# Patient Record
Sex: MALE | Race: WHITE | NOT HISPANIC OR LATINO | Employment: FULL TIME
[De-identification: names, ages, dates, MRNs, and addresses within clinical notes are randomized per-mention and may not be internally consistent; named-entity substitution may affect disease eponyms.]

---

## 2024-08-02 ENCOUNTER — TRANSCRIBE ORDERS (OUTPATIENT)
Dept: OTHER | Age: 44
End: 2024-08-02

## 2024-08-02 DIAGNOSIS — Z98.890 S/P LAMINECTOMY: Primary | ICD-10-CM

## 2024-08-27 ENCOUNTER — THERAPY VISIT (OUTPATIENT)
Dept: PHYSICAL THERAPY | Facility: CLINIC | Age: 44
End: 2024-08-27
Payer: COMMERCIAL

## 2024-08-27 DIAGNOSIS — M54.16 LUMBAR RADICULOPATHY: Primary | ICD-10-CM

## 2024-08-27 PROCEDURE — 97161 PT EVAL LOW COMPLEX 20 MIN: CPT | Mod: GP | Performed by: PHYSICAL THERAPIST

## 2024-08-27 PROCEDURE — 97110 THERAPEUTIC EXERCISES: CPT | Mod: GP | Performed by: PHYSICAL THERAPIST

## 2024-08-27 NOTE — PROGRESS NOTES
"PHYSICAL THERAPY EVALUATION  Type of Visit: Evaluation        Fall Risk Screen:  Fall screen completed by: PT  Have you fallen 2 or more times in the past year?: No  Have you fallen and had an injury in the past year?: No  Is patient a fall risk?: No    Subjective       Presenting condition or subjective complaint: lumbar post op therapy due to stenosis  Date of onset: 06/10/24    Relevant medical history: High blood pressure; Neck injury; Overweight   Dates & types of surgery: 6/10/2024 - L4-S1 foraminotomy    Pt dates current issue back to a snowboarding accident years ago.  Was having difficulty with numbness and weakness into the legs.  Led to lumbar decompression 06/10/2024.  Pt reports doing much better compared to before surgery.  Some numbness into the L leg and occasional cramping in R thigh.  Gave up squats and lunges nearly ten years ago because he didn't feel as \"stable\" in the low back as he wanted to be.  PT in the past has gone well.  Hasn't had much issue with work at home.  No longer having difficulty walking.  \"I don't really have pain\" but sometimtes \"it can tighten up.\"    Goals: bench press, triceps, lats.  \"Not squats or lunges but I don't want to ignore my lower body.\"  Has free weights and incline bench, bands at home.    Prior diagnostic imaging/testing results: MRI; X-ray     Prior therapy history for the same diagnosis, illness or injury: No      Prior Level of Function  Transfers: Independent  Ambulation: Independent  ADL: Independent    Living Environment  Social support: With a significant other or spouse   Type of home: House   Stairs to enter the home: Yes 6 Is there a railing: Yes     Ramp: No   Stairs inside the home: Yes 3 Is there a railing: Yes     Help at home: None  Equipment owned:       Employment: Yes Technology  Hobbies/Interests: boating, campfires, swimming, snowboarding    Patient goals for therapy: i have been unable to lift weights 3x per week since 10/2022. Now that I " have had surgery I would like assistance with a workout routine that will allow me to lift while not risking injury.       Objective   LUMBAR SPINE EVALUATION  PAIN: see above  INTEGUMENTARY (edema, incisions): midline incisional scar without tenderness  POSTURE: WNL  GAIT: Pt ambulates without device without gross asymmetry.  ROM: Lumbar AROM all directions grossly without restriction or discomfort.  PELVIC/SI SCREEN: Negative Haroon, thigh thrust, FADIR B.  STRENGTH: TA MMT 2/5 without symptoms.  MYOTOMES: Distally 5/5 B without symptoms.  DTR S: 2+ patellar, Achilles B.  DERMATOMES: WNL    Assessment & Plan   CLINICAL IMPRESSIONS  Medical Diagnosis: s/p laminectomy    Treatment Diagnosis: lumbar radiculopathy   Impression/Assessment: Patient is a 43 year old male with back complaints.  The following significant findings have been identified: Decreased strength and Decreased activity tolerance. These impairments interfere with their ability to perform self care tasks as compared to previous level of function.     Clinical Decision Making (Complexity):  Clinical Presentation: Stable/Uncomplicated  Clinical Presentation Rationale: based on medical and personal factors listed in PT evaluation  Clinical Decision Making (Complexity): Low complexity    PLAN OF CARE  Treatment Interventions:  Interventions: Neuromuscular Re-education, Therapeutic Activity, Therapeutic Exercise    Long Term Goals     PT Goal 1  Goal Description: Lift 50 pounds floor to waist  Rationale: to maximize safety and independence with performance of ADLs and functional tasks  Goal Progress: Pt limiting lifting  Target Date: 09/24/24      Frequency of Treatment: once per week  Duration of Treatment: four weeks    Education Assessment:   Learner/Method: Patient  Education Comments: Performance in clinic    Risks and benefits of evaluation/treatment have been explained.   Patient/Family/caregiver agrees with Plan of Care.     Evaluation Time:      PT Alexandru, Low Complexity Minutes (43715): 25    Signing Clinician: Marco Alicia PT

## 2024-09-05 ENCOUNTER — THERAPY VISIT (OUTPATIENT)
Dept: PHYSICAL THERAPY | Facility: CLINIC | Age: 44
End: 2024-09-05
Payer: COMMERCIAL

## 2024-09-05 DIAGNOSIS — M54.16 LUMBAR RADICULOPATHY: Primary | ICD-10-CM

## 2024-09-05 PROCEDURE — 97110 THERAPEUTIC EXERCISES: CPT | Mod: GP | Performed by: PHYSICAL THERAPIST

## 2024-10-03 ENCOUNTER — THERAPY VISIT (OUTPATIENT)
Dept: PHYSICAL THERAPY | Facility: CLINIC | Age: 44
End: 2024-10-03
Payer: COMMERCIAL

## 2024-10-03 DIAGNOSIS — M54.16 LUMBAR RADICULOPATHY: Primary | ICD-10-CM

## 2024-10-03 PROCEDURE — 97110 THERAPEUTIC EXERCISES: CPT | Mod: GP | Performed by: PHYSICAL THERAPIST
